# Patient Record
Sex: FEMALE | URBAN - METROPOLITAN AREA
[De-identification: names, ages, dates, MRNs, and addresses within clinical notes are randomized per-mention and may not be internally consistent; named-entity substitution may affect disease eponyms.]

---

## 2023-01-20 LAB
HEPATITIS B SURFACE ANTIGEN (EXTERNAL): NEGATIVE
RUBELLA ANTIBODY IGG (EXTERNAL): NORMAL
VDRL (SYPHILIS) (EXTERNAL): NONREACTIVE

## 2023-08-01 LAB — GROUP B STREPTOCOCCUS (EXTERNAL): NEGATIVE

## 2023-08-19 ENCOUNTER — ANESTHESIA (OUTPATIENT)
Dept: OBGYN | Facility: CLINIC | Age: 33
End: 2023-08-19
Payer: COMMERCIAL

## 2023-08-19 ENCOUNTER — HOSPITAL ENCOUNTER (INPATIENT)
Facility: CLINIC | Age: 33
LOS: 2 days | Discharge: HOME | End: 2023-08-21
Attending: OBSTETRICS & GYNECOLOGY | Admitting: OBSTETRICS & GYNECOLOGY
Payer: COMMERCIAL

## 2023-08-19 ENCOUNTER — ANESTHESIA EVENT (OUTPATIENT)
Dept: OBGYN | Facility: CLINIC | Age: 33
End: 2023-08-19
Payer: COMMERCIAL

## 2023-08-19 LAB
ABO/RH(D): NORMAL
ANTIBODY SCREEN: NEGATIVE
HGB BLD-MCNC: 12.8 G/DL (ref 11.7–15.7)
HOLD SPECIMEN: NORMAL
HOLD SPECIMEN: NORMAL
SPECIMEN EXPIRATION DATE: NORMAL

## 2023-08-19 PROCEDURE — 120N000001 HC R&B MED SURG/OB

## 2023-08-19 PROCEDURE — 00HU33Z INSERTION OF INFUSION DEVICE INTO SPINAL CANAL, PERCUTANEOUS APPROACH: ICD-10-PCS | Performed by: ANESTHESIOLOGY

## 2023-08-19 PROCEDURE — 86780 TREPONEMA PALLIDUM: CPT | Performed by: OBSTETRICS & GYNECOLOGY

## 2023-08-19 PROCEDURE — 370N000003 HC ANESTHESIA WARD SERVICE: Performed by: ANESTHESIOLOGY

## 2023-08-19 PROCEDURE — 258N000003 HC RX IP 258 OP 636: Performed by: OBSTETRICS & GYNECOLOGY

## 2023-08-19 PROCEDURE — 36415 COLL VENOUS BLD VENIPUNCTURE: CPT | Performed by: OBSTETRICS & GYNECOLOGY

## 2023-08-19 PROCEDURE — G0463 HOSPITAL OUTPT CLINIC VISIT: HCPCS

## 2023-08-19 PROCEDURE — 85018 HEMOGLOBIN: CPT | Performed by: OBSTETRICS & GYNECOLOGY

## 2023-08-19 PROCEDURE — 86850 RBC ANTIBODY SCREEN: CPT | Performed by: OBSTETRICS & GYNECOLOGY

## 2023-08-19 PROCEDURE — 3E0R3BZ INTRODUCTION OF ANESTHETIC AGENT INTO SPINAL CANAL, PERCUTANEOUS APPROACH: ICD-10-PCS | Performed by: ANESTHESIOLOGY

## 2023-08-19 PROCEDURE — 250N000011 HC RX IP 250 OP 636: Mod: JZ | Performed by: ANESTHESIOLOGY

## 2023-08-19 PROCEDURE — 250N000011 HC RX IP 250 OP 636: Performed by: ANESTHESIOLOGY

## 2023-08-19 RX ORDER — KETOROLAC TROMETHAMINE 30 MG/ML
30 INJECTION, SOLUTION INTRAMUSCULAR; INTRAVENOUS
Status: DISCONTINUED | OUTPATIENT
Start: 2023-08-19 | End: 2023-08-20

## 2023-08-19 RX ORDER — OXYTOCIN/0.9 % SODIUM CHLORIDE 30/500 ML
340 PLASTIC BAG, INJECTION (ML) INTRAVENOUS CONTINUOUS PRN
Status: DISCONTINUED | OUTPATIENT
Start: 2023-08-19 | End: 2023-08-20

## 2023-08-19 RX ORDER — METOCLOPRAMIDE HYDROCHLORIDE 5 MG/ML
10 INJECTION INTRAMUSCULAR; INTRAVENOUS EVERY 6 HOURS PRN
Status: DISCONTINUED | OUTPATIENT
Start: 2023-08-19 | End: 2023-08-20

## 2023-08-19 RX ORDER — PROCHLORPERAZINE MALEATE 10 MG
10 TABLET ORAL EVERY 6 HOURS PRN
Status: DISCONTINUED | OUTPATIENT
Start: 2023-08-19 | End: 2023-08-20

## 2023-08-19 RX ORDER — IBUPROFEN 400 MG/1
800 TABLET, FILM COATED ORAL
Status: DISCONTINUED | OUTPATIENT
Start: 2023-08-19 | End: 2023-08-20

## 2023-08-19 RX ORDER — ROPIVACAINE HYDROCHLORIDE 2 MG/ML
10 INJECTION, SOLUTION EPIDURAL; INFILTRATION; PERINEURAL ONCE
Status: DISCONTINUED | OUTPATIENT
Start: 2023-08-19 | End: 2023-08-20

## 2023-08-19 RX ORDER — PROCHLORPERAZINE 25 MG
25 SUPPOSITORY, RECTAL RECTAL EVERY 12 HOURS PRN
Status: DISCONTINUED | OUTPATIENT
Start: 2023-08-19 | End: 2023-08-20

## 2023-08-19 RX ORDER — CITRIC ACID/SODIUM CITRATE 334-500MG
30 SOLUTION, ORAL ORAL
Status: DISCONTINUED | OUTPATIENT
Start: 2023-08-19 | End: 2023-08-20

## 2023-08-19 RX ORDER — LIDOCAINE HYDROCHLORIDE AND EPINEPHRINE 15; 5 MG/ML; UG/ML
3 INJECTION, SOLUTION EPIDURAL
Status: DISCONTINUED | OUTPATIENT
Start: 2023-08-19 | End: 2023-08-20

## 2023-08-19 RX ORDER — NALOXONE HYDROCHLORIDE 0.4 MG/ML
0.2 INJECTION, SOLUTION INTRAMUSCULAR; INTRAVENOUS; SUBCUTANEOUS
Status: DISCONTINUED | OUTPATIENT
Start: 2023-08-19 | End: 2023-08-20

## 2023-08-19 RX ORDER — ONDANSETRON 2 MG/ML
4 INJECTION INTRAMUSCULAR; INTRAVENOUS EVERY 6 HOURS PRN
Status: DISCONTINUED | OUTPATIENT
Start: 2023-08-19 | End: 2023-08-20

## 2023-08-19 RX ORDER — OXYTOCIN 10 [USP'U]/ML
10 INJECTION, SOLUTION INTRAMUSCULAR; INTRAVENOUS
Status: DISCONTINUED | OUTPATIENT
Start: 2023-08-19 | End: 2023-08-20

## 2023-08-19 RX ORDER — MISOPROSTOL 200 UG/1
400 TABLET ORAL
Status: DISCONTINUED | OUTPATIENT
Start: 2023-08-19 | End: 2023-08-20

## 2023-08-19 RX ORDER — ONDANSETRON 4 MG/1
4 TABLET, ORALLY DISINTEGRATING ORAL EVERY 6 HOURS PRN
Status: DISCONTINUED | OUTPATIENT
Start: 2023-08-19 | End: 2023-08-20

## 2023-08-19 RX ORDER — ROPIVACAINE HYDROCHLORIDE 2 MG/ML
INJECTION, SOLUTION EPIDURAL; INFILTRATION; PERINEURAL
Status: COMPLETED | OUTPATIENT
Start: 2023-08-19 | End: 2023-08-19

## 2023-08-19 RX ORDER — NALOXONE HYDROCHLORIDE 0.4 MG/ML
0.4 INJECTION, SOLUTION INTRAMUSCULAR; INTRAVENOUS; SUBCUTANEOUS
Status: DISCONTINUED | OUTPATIENT
Start: 2023-08-19 | End: 2023-08-20

## 2023-08-19 RX ORDER — CARBOPROST TROMETHAMINE 250 UG/ML
250 INJECTION, SOLUTION INTRAMUSCULAR
Status: DISCONTINUED | OUTPATIENT
Start: 2023-08-19 | End: 2023-08-20

## 2023-08-19 RX ORDER — FENTANYL CITRATE-0.9 % NACL/PF 10 MCG/ML
100 PLASTIC BAG, INJECTION (ML) INTRAVENOUS EVERY 5 MIN PRN
Status: DISCONTINUED | OUTPATIENT
Start: 2023-08-19 | End: 2023-08-20

## 2023-08-19 RX ORDER — NALBUPHINE HYDROCHLORIDE 20 MG/ML
2.5-5 INJECTION, SOLUTION INTRAMUSCULAR; INTRAVENOUS; SUBCUTANEOUS EVERY 6 HOURS PRN
Status: DISCONTINUED | OUTPATIENT
Start: 2023-08-19 | End: 2023-08-20

## 2023-08-19 RX ORDER — TRANEXAMIC ACID 10 MG/ML
1 INJECTION, SOLUTION INTRAVENOUS EVERY 30 MIN PRN
Status: DISCONTINUED | OUTPATIENT
Start: 2023-08-19 | End: 2023-08-20

## 2023-08-19 RX ORDER — OXYTOCIN/0.9 % SODIUM CHLORIDE 30/500 ML
100-340 PLASTIC BAG, INJECTION (ML) INTRAVENOUS CONTINUOUS PRN
Status: DISCONTINUED | OUTPATIENT
Start: 2023-08-19 | End: 2023-08-20

## 2023-08-19 RX ORDER — METHYLERGONOVINE MALEATE 0.2 MG/ML
200 INJECTION INTRAVENOUS
Status: DISCONTINUED | OUTPATIENT
Start: 2023-08-19 | End: 2023-08-20

## 2023-08-19 RX ORDER — MISOPROSTOL 200 UG/1
800 TABLET ORAL
Status: DISCONTINUED | OUTPATIENT
Start: 2023-08-19 | End: 2023-08-20

## 2023-08-19 RX ORDER — METOCLOPRAMIDE 10 MG/1
10 TABLET ORAL EVERY 6 HOURS PRN
Status: DISCONTINUED | OUTPATIENT
Start: 2023-08-19 | End: 2023-08-20

## 2023-08-19 RX ADMIN — SODIUM CHLORIDE, POTASSIUM CHLORIDE, SODIUM LACTATE AND CALCIUM CHLORIDE 1000 ML: 600; 310; 30; 20 INJECTION, SOLUTION INTRAVENOUS at 20:13

## 2023-08-19 RX ADMIN — Medication: at 21:09

## 2023-08-19 RX ADMIN — ROPIVACAINE HYDROCHLORIDE 10 ML: 2 INJECTION, SOLUTION EPIDURAL; INFILTRATION at 21:22

## 2023-08-19 ASSESSMENT — ACTIVITIES OF DAILY LIVING (ADL)
WALKING_OR_CLIMBING_STAIRS_DIFFICULTY: NO
DRESSING/BATHING_DIFFICULTY: NO
ADLS_ACUITY_SCORE: 18
DIFFICULTY_EATING/SWALLOWING: NO
CHANGE_IN_FUNCTIONAL_STATUS_SINCE_ONSET_OF_CURRENT_ILLNESS/INJURY: NO
CONCENTRATING,_REMEMBERING_OR_MAKING_DECISIONS_DIFFICULTY: NO
FALL_HISTORY_WITHIN_LAST_SIX_MONTHS: NO
ADLS_ACUITY_SCORE: 35
TOILETING_ISSUES: NO
DOING_ERRANDS_INDEPENDENTLY_DIFFICULTY: NO
WEAR_GLASSES_OR_BLIND: NO
ADLS_ACUITY_SCORE: 18

## 2023-08-20 LAB
HGB BLD-MCNC: 10.1 G/DL (ref 11.7–15.7)
T PALLIDUM AB SER QL: NONREACTIVE

## 2023-08-20 PROCEDURE — 722N000001 HC LABOR CARE VAGINAL DELIVERY SINGLE

## 2023-08-20 PROCEDURE — 85018 HEMOGLOBIN: CPT | Performed by: OBSTETRICS & GYNECOLOGY

## 2023-08-20 PROCEDURE — 250N000013 HC RX MED GY IP 250 OP 250 PS 637: Performed by: OBSTETRICS & GYNECOLOGY

## 2023-08-20 PROCEDURE — 120N000012 HC R&B POSTPARTUM

## 2023-08-20 PROCEDURE — 36415 COLL VENOUS BLD VENIPUNCTURE: CPT | Performed by: OBSTETRICS & GYNECOLOGY

## 2023-08-20 PROCEDURE — 0KQM0ZZ REPAIR PERINEUM MUSCLE, OPEN APPROACH: ICD-10-PCS | Performed by: OBSTETRICS & GYNECOLOGY

## 2023-08-20 PROCEDURE — 250N000009 HC RX 250: Performed by: OBSTETRICS & GYNECOLOGY

## 2023-08-20 PROCEDURE — 999N000016 HC STATISTIC ATTENDANCE AT DELIVERY

## 2023-08-20 RX ORDER — ACETAMINOPHEN 325 MG/10.15ML
650 LIQUID ORAL EVERY 4 HOURS PRN
Status: DISCONTINUED | OUTPATIENT
Start: 2023-08-20 | End: 2023-08-21 | Stop reason: HOSPADM

## 2023-08-20 RX ORDER — CARBOPROST TROMETHAMINE 250 UG/ML
250 INJECTION, SOLUTION INTRAMUSCULAR
Status: DISCONTINUED | OUTPATIENT
Start: 2023-08-20 | End: 2023-08-21 | Stop reason: HOSPADM

## 2023-08-20 RX ORDER — NALOXONE HYDROCHLORIDE 0.4 MG/ML
0.4 INJECTION, SOLUTION INTRAMUSCULAR; INTRAVENOUS; SUBCUTANEOUS
Status: DISCONTINUED | OUTPATIENT
Start: 2023-08-20 | End: 2023-08-21 | Stop reason: HOSPADM

## 2023-08-20 RX ORDER — IBUPROFEN 400 MG/1
800 TABLET, FILM COATED ORAL EVERY 6 HOURS PRN
Status: DISCONTINUED | OUTPATIENT
Start: 2023-08-20 | End: 2023-08-20

## 2023-08-20 RX ORDER — MODIFIED LANOLIN
OINTMENT (GRAM) TOPICAL
Status: DISCONTINUED | OUTPATIENT
Start: 2023-08-20 | End: 2023-08-21 | Stop reason: HOSPADM

## 2023-08-20 RX ORDER — MISOPROSTOL 200 UG/1
400 TABLET ORAL
Status: DISCONTINUED | OUTPATIENT
Start: 2023-08-20 | End: 2023-08-21 | Stop reason: HOSPADM

## 2023-08-20 RX ORDER — NALOXONE HYDROCHLORIDE 0.4 MG/ML
0.2 INJECTION, SOLUTION INTRAMUSCULAR; INTRAVENOUS; SUBCUTANEOUS
Status: DISCONTINUED | OUTPATIENT
Start: 2023-08-20 | End: 2023-08-21 | Stop reason: HOSPADM

## 2023-08-20 RX ORDER — HYDROCORTISONE 25 MG/G
CREAM TOPICAL 3 TIMES DAILY PRN
Status: DISCONTINUED | OUTPATIENT
Start: 2023-08-20 | End: 2023-08-21 | Stop reason: HOSPADM

## 2023-08-20 RX ORDER — METHYLERGONOVINE MALEATE 0.2 MG/ML
200 INJECTION INTRAVENOUS
Status: DISCONTINUED | OUTPATIENT
Start: 2023-08-20 | End: 2023-08-21 | Stop reason: HOSPADM

## 2023-08-20 RX ORDER — OXYTOCIN 10 [USP'U]/ML
10 INJECTION, SOLUTION INTRAMUSCULAR; INTRAVENOUS
Status: DISCONTINUED | OUTPATIENT
Start: 2023-08-20 | End: 2023-08-21 | Stop reason: HOSPADM

## 2023-08-20 RX ORDER — OXYTOCIN/0.9 % SODIUM CHLORIDE 30/500 ML
340 PLASTIC BAG, INJECTION (ML) INTRAVENOUS CONTINUOUS PRN
Status: DISCONTINUED | OUTPATIENT
Start: 2023-08-20 | End: 2023-08-21 | Stop reason: HOSPADM

## 2023-08-20 RX ORDER — BISACODYL 10 MG
10 SUPPOSITORY, RECTAL RECTAL DAILY PRN
Status: DISCONTINUED | OUTPATIENT
Start: 2023-08-20 | End: 2023-08-21 | Stop reason: HOSPADM

## 2023-08-20 RX ORDER — DOCUSATE SODIUM 100 MG/1
100 CAPSULE, LIQUID FILLED ORAL DAILY
Status: DISCONTINUED | OUTPATIENT
Start: 2023-08-20 | End: 2023-08-20

## 2023-08-20 RX ORDER — DOCUSATE SODIUM 100 MG/1
100 CAPSULE, LIQUID FILLED ORAL DAILY
Status: DISCONTINUED | OUTPATIENT
Start: 2023-08-21 | End: 2023-08-21 | Stop reason: HOSPADM

## 2023-08-20 RX ORDER — TRANEXAMIC ACID 10 MG/ML
1 INJECTION, SOLUTION INTRAVENOUS EVERY 30 MIN PRN
Status: DISCONTINUED | OUTPATIENT
Start: 2023-08-20 | End: 2023-08-21 | Stop reason: HOSPADM

## 2023-08-20 RX ORDER — IBUPROFEN 100 MG/5ML
800 SUSPENSION, ORAL (FINAL DOSE FORM) ORAL EVERY 6 HOURS PRN
Status: DISCONTINUED | OUTPATIENT
Start: 2023-08-20 | End: 2023-08-21 | Stop reason: HOSPADM

## 2023-08-20 RX ORDER — MISOPROSTOL 200 UG/1
800 TABLET ORAL
Status: DISCONTINUED | OUTPATIENT
Start: 2023-08-20 | End: 2023-08-21 | Stop reason: HOSPADM

## 2023-08-20 RX ORDER — ACETAMINOPHEN 325 MG/1
650 TABLET ORAL EVERY 4 HOURS PRN
Status: DISCONTINUED | OUTPATIENT
Start: 2023-08-20 | End: 2023-08-20

## 2023-08-20 RX ADMIN — LIDOCAINE HYDROCHLORIDE 20 ML: 10 INJECTION, SOLUTION EPIDURAL; INFILTRATION; INTRACAUDAL; PERINEURAL at 00:41

## 2023-08-20 RX ADMIN — IBUPROFEN 800 MG: 200 SUSPENSION ORAL at 16:37

## 2023-08-20 RX ADMIN — ACETAMINOPHEN 650 MG: 325 SUSPENSION ORAL at 13:16

## 2023-08-20 RX ADMIN — Medication 340 ML/HR: at 00:31

## 2023-08-20 RX ADMIN — ACETAMINOPHEN 650 MG: 325 SUSPENSION ORAL at 01:35

## 2023-08-20 RX ADMIN — DOCUSATE SODIUM LIQUID 100 MG: 100 LIQUID ORAL at 11:16

## 2023-08-20 RX ADMIN — IBUPROFEN 800 MG: 200 SUSPENSION ORAL at 11:16

## 2023-08-20 RX ADMIN — ACETAMINOPHEN 650 MG: 325 SUSPENSION ORAL at 19:38

## 2023-08-20 RX ADMIN — IBUPROFEN 800 MG: 200 SUSPENSION ORAL at 01:33

## 2023-08-20 ASSESSMENT — ACTIVITIES OF DAILY LIVING (ADL)
ADLS_ACUITY_SCORE: 18

## 2023-08-20 NOTE — ANESTHESIA PROCEDURE NOTES
"Epidural catheter Procedure Note    Pre-Procedure   Staff -        Anesthesiologist:  Jorge Luis Jarvis MD       Performed By: Anesthesiologist       Location: OB       Procedure Start/Stop Times: 8/19/2023 9:04 PM and 8/19/2023 9:22 PM       Pre-Anesthestic Checklist: patient identified, IV checked, site marked, risks and benefits discussed, informed consent, monitors and equipment checked and pre-op evaluation  Timeout:       Correct Patient: Yes        Correct Procedure: Yes        Correct Site: Yes        Correct Position: Yes   Procedure Documentation  Procedure: epidural catheter       Patient Position: sitting       Patient Prep/Sterile Barriers: sterile gloves, mask, patient draped       Skin prep: Betadine       Local skin infiltrated with 3 mL of 1% lidocaine.        Insertion Site: L3-4. (midline approach).       Technique: LORT air        Needle Type: Metconnexy needle       Needle Gauge: 17.        Needle Length (Inches): 3.5        Catheter: 19 G.          Catheter threaded easily.         3.5 cm epidural space.           # of attempts: 1 and  # of redirects:  0    Assessment/Narrative         Paresthesias: No.       Test dose of 3 mL lidocaine 1.5% w/ 1:200,000 epinephrine at 21:22 CDT.         Test dose negative, 3 minutes after injection, for signs of intravascular, subdural, or intrathecal injection.       Insertion/Infusion Method: LORT air       Aspiration negative for Heme or CSF via Epidural Catheter.    Medication(s) Administered   0.2% Ropivacaine (Epidural) - EPIDURAL   10 mL - 8/19/2023 9:22:00 PM  Medication Administration Time: 8/19/2023 9:04 PM     Comments:  Pt tolerated procedure well.   No complications.    The epidural was bolused with 10 ml of 0.2% Ropivacaine in incremental doses and intermittent negative aspiration after negative test dose.         FOR [facility] ([facility] [place]) ONLY:   Pain Team Contact information: please page the Pain Team Via [employer]. Search \"Pain\". During daytime hours, " please page the attending first. At night please page the resident first.

## 2023-08-20 NOTE — H&P
No significant change in general health status based on examination of the patient, review of Nursing Admission Database and prenatal record.    EFW: 7lb 8oz    Candice Martinez MD

## 2023-08-20 NOTE — PLAN OF CARE
Data: Patient presented to [facility]: 2023  7:02 PM.  Reason for maternal/fetal assessment is uterine contractions. Patient reports contractions every 2 minutes for last few hours. Patient denies leaking of vaginal fluid/rupture of membranes, vaginal bleeding. Patient reports fetal movement is normal. Patient is a 40w0d . Prenatal record reviewed. Pregnancy has been uncomplicated. Support person is present.     Fetal HR baseline was 125 bpm, variability is moderate. Accelerations: present. Decelerations: absent. Uterine assessment is strong/firm during contractions and soft at rest. Cervix 5 cm dilated and 80-90% effaced. Fetal station -2. Fetal presentation cephalic per cervical exam. Membranes: intact.    Vital signs wnl. Patient reports pain and is coping.     Action: Verbal consent for EFM. Triage assessment completed. Patient does not meet criteria for early labor discharge.     Response: Patient verbalized agreement with plan. Dr. Martinez on unit and notified of but not limited to SVE, contractions, and fetal heart tracing. Orders to admit to inpatient.

## 2023-08-20 NOTE — PROCEDURES
Delivery Note  Diagnosis: Intrauterine pregnancy at 40w1d,  , Spontaneous labor  Procedure:  Findings: Liveborn female infant,  Anesthesia: Epidural    QBL:  100 cc    Nahomy Grande is a 33 year old  female at 40w1d weeks gestation. Her pregnancy was uncomplicated. She presented with painful contractions. Her membranes were artificially ruptured.  She received epidural for pain management. However, after hour of pushing had poor pain control and fatigue. Requested VAVD. Verbal consent obtained. Bladder was empties and NICU present. 3+ in JARAD. Placed 2 cm anterior to the posterior fontanelle. 1 pull, 5 seconds and no pop off. Patient progressed to complete and delivered a viable infant without complication. The infant was placed on the patient's abdomen. Cord clamping was delayed by 1 minutes, at which time the cord was doubly clamped and cut. The third stage was actively managed with external uterine massage, gentle cord traction and pitocin. The placenta delivered spontaneously and intact. Second degree laceration noted and repaired in the usual fashion. Excellent hemostasis was noted. All counts were correct before and after the delivery.     Candiec Martinez MD

## 2023-08-20 NOTE — PLAN OF CARE
Vital signs stable. Postpartum assessment WDL. Pain controlled with Tylenol and Ibuprofen given at 0140 in L&D. Patient voiding without difficulty. Working on breastfeeding infant every 2-3 hours. Patient and infant bonding well. Encouraged to call with questions/concerns. Will continue with current plan of care.

## 2023-08-20 NOTE — PROGRESS NOTES
"Labor Progress Note:    S: Pt is comfortable with epidural. Cervix was 3.5 cm in the clinic and had membranes stripped in the office. Had onset of contractions and reported to labor and delivery.     O:  /70   Temp 97.1  F (36.2  C)   Ht 1.651 m (5' 5\")   Wt 73.9 kg (163 lb)   SpO2 97%   BMI 27.12 kg/m    SVE: 8.5/80/-2 AROM  TOCO: Q2 minutes  FHR: 145 baseline. Category 1.     A/P: 32 yo  at 40+4/7 wks.   Anticipate . Due to rapid progressions will recheck as needed.   Epidural for pain management.   3. GBS negative, RH positive.     Candice Martinez MD    "

## 2023-08-20 NOTE — PLAN OF CARE
0300: Patient up to bathroom via ambulation. Voided 200mL and educated on pericares and expected bleeding.     Patient transferred up to postpartum room 408 via wheelchair with infant in arms, both in stable condition.     Bedside report given to Leola LUNA RN and verified ID bands.

## 2023-08-20 NOTE — PROGRESS NOTES
[facility]   Obstetrics Progress Note    Subjective: This is the patient's first day since Vaginal delivery. She is doing well.  She is urinating on her own. Pain is controlled with medication.    Objective:   All vitals stable  Temp: 98  F (36.7  C) Temp src: Oral BP: 110/62 Pulse: 97   Resp: 16 SpO2: 98 % O2 Device: None (Room air)      EXAM:  Constitutional: healthy, alert, no distress.   Abdomen: Abdomen soft, non-tender. BS normal. No masses, fundus is firm.  JOINT/EXTREMITIES: extremities normal     Last hemoglobin was   Hemoglobin   Date Value Ref Range Status   08/19/2023 12.8 11.7 - 15.7 g/dL Final   ]    Assessment: Stable postpartum course.    Plan: Routine care. Ambulation encouraged  Breast feeding strategies discussed  Pain control measures as needed  Reportable signs and symptoms dicussed with the patient  Anticipate discharge tomorrow    Candice Martinez MD

## 2023-08-20 NOTE — PLAN OF CARE
Vital signs stable. Postpartum assessment WDL. Pain controlled with liquid tylenol, ibuprofen. Patient voiding without difficulty. Breastfeeding on cue with full assist, baby sleepy so skin to skin done, using shield PRN. Patient and infant bonding well. Will continue with current plan of care.

## 2023-08-20 NOTE — PROGRESS NOTES
2230: Provider at bedside, SVE 10 cm. Patient educated on pushing and expected plan for delivery. Patient and spouse verbalized understanding.     2236: Bed broke down and pushing started, RN and provider continuously at bedside.     0020: Patient requesting vacuum assistance from provider due to intolerance of pain and fatigue. Provider educated on risks and benefits and patient and spouse verbalized understanding.     0026: NICU team called for vacuum delivery, Provider applied vacuum and pulled x1 for 5 seconds.  of female infant @0026 with 2nd degree laceration with repair.

## 2023-08-20 NOTE — ANESTHESIA PREPROCEDURE EVALUATION
Anesthesia Pre-Procedure Evaluation    Patient: Nahmoy Grande   MRN: [id number] : 1990        Procedure : * No procedures listed *          History reviewed. No pertinent past medical history.   History reviewed. No pertinent surgical history.   Not on File   Social History     Tobacco Use    Smoking status: Never     Passive exposure: Never    Smokeless tobacco: Never   Substance Use Topics    Alcohol use: Not Currently      Wt Readings from Last 1 Encounters:   23 73.9 kg (163 lb)        Anesthesia Evaluation   Pt has had prior anesthetic.     No history of anesthetic complications       ROS/MED HX  ENT/Pulmonary:    (-) asthma   Neurologic:  - neg neurologic ROS     Cardiovascular:    (-) PIH   METS/Exercise Tolerance:     Hematologic:     (+)  no anticoagulation therapy, no coagulopathy,             Musculoskeletal:       GI/Hepatic:     (+) GERD,                   Renal/Genitourinary:       Endo:       Psychiatric/Substance Use:       Infectious Disease:       Malignancy:       Other:            Physical Exam    Airway        Mallampati: II   TM distance: > 3 FB   Neck ROM: full     Respiratory Devices and Support         Dental  no notable dental history         Cardiovascular   cardiovascular exam normal          Pulmonary   pulmonary exam normal                OUTSIDE LABS:  CBC:   Lab Results   Component Value Date    HGB 12.8 2023     BMP: No results found for: NA, POTASSIUM, CHLORIDE, CO2, BUN, CR, GLC  COAGS: No results found for: PTT, INR, FIBR  POC: No results found for: BGM, HCG, HCGS  HEPATIC: No results found for: ALBUMIN, PROTTOTAL, ALT, AST, GGT, ALKPHOS, BILITOTAL, BILIDIRECT, ALPESH  OTHER: No results found for: PH, LACT, A1C, KOBE, PHOS, MAG, LIPASE, AMYLASE, TSH, T4, T3, CRP, SED    Anesthesia Plan    ASA Status:  2       Anesthesia Type: Epidural.              Consents    Anesthesia Plan(s) and associated risks, benefits, and realistic alternatives discussed. Questions  answered and patient/representative(s) expressed understanding.     - Discussed:     - Discussed with:  Patient            Postoperative Care            Comments:    Other Comments: Orders to manage the epidural infusion have been entered, and through coordination with the nurse, we will continute to manage and monitor the patient's labor epidural.  We will continuously be available to adjust as needed thruout the entire L&D process.             Jorge Luis Jarvis MD

## 2023-08-21 VITALS
SYSTOLIC BLOOD PRESSURE: 109 MMHG | HEART RATE: 82 BPM | WEIGHT: 156.4 LBS | BODY MASS INDEX: 26.06 KG/M2 | HEIGHT: 65 IN | RESPIRATION RATE: 16 BRPM | DIASTOLIC BLOOD PRESSURE: 70 MMHG | TEMPERATURE: 97.9 F | OXYGEN SATURATION: 98 %

## 2023-08-21 PROCEDURE — 90707 MMR VACCINE SC: CPT | Performed by: OBSTETRICS & GYNECOLOGY

## 2023-08-21 PROCEDURE — 90471 IMMUNIZATION ADMIN: CPT | Performed by: OBSTETRICS & GYNECOLOGY

## 2023-08-21 PROCEDURE — 250N000013 HC RX MED GY IP 250 OP 250 PS 637: Performed by: OBSTETRICS & GYNECOLOGY

## 2023-08-21 PROCEDURE — 250N000011 HC RX IP 250 OP 636: Performed by: OBSTETRICS & GYNECOLOGY

## 2023-08-21 RX ADMIN — ACETAMINOPHEN 650 MG: 325 SUSPENSION ORAL at 00:33

## 2023-08-21 RX ADMIN — MEASLES, MUMPS, AND RUBELLA VIRUS VACCINE LIVE 0.5 ML: 1000; 12500; 1000 INJECTION, POWDER, LYOPHILIZED, FOR SUSPENSION SUBCUTANEOUS at 11:24

## 2023-08-21 RX ADMIN — DOCUSATE SODIUM 100 MG: 100 CAPSULE, LIQUID FILLED ORAL at 08:52

## 2023-08-21 RX ADMIN — IBUPROFEN 800 MG: 200 SUSPENSION ORAL at 00:33

## 2023-08-21 RX ADMIN — ACETAMINOPHEN 650 MG: 325 SUSPENSION ORAL at 08:53

## 2023-08-21 RX ADMIN — IBUPROFEN 800 MG: 200 SUSPENSION ORAL at 06:05

## 2023-08-21 ASSESSMENT — ACTIVITIES OF DAILY LIVING (ADL)
ADLS_ACUITY_SCORE: 18

## 2023-08-21 NOTE — LACTATION NOTE
Follow up Lactation visit with Nahomy, significant other Haider & baby girl Clarke. Getting ready for discharge. Nahomy reports feeding is going well, feeding with the nipple shield and baby has been latching well and feeding frequently. At time of visit, Clarke ready to feed. Reviewed general positioning guidance, using pillow and blanket roll supports, and shield placement. Clarke latched, but Nahomy felt latch was initially painful. Encouraged taking [place] off and relatching, then when latch was still painful, LC rolled lower lip down and out and able to get a deeper, more comfortable latch. Haider is comfortable helping with rolling lower lip down and out as needed to assist.    Discussed cluster feeding, what it is and when to expect it, The Second Night, satiety cues, feeding cues, and reviewed Feeding Log for home use. Encouraged to review Breastfeeding section in Your Guide to Postpartum &  Care.    Discussed listening for audible swallowing as milk volume increases and looking for milk inside of the shield after feedings to determine if baby is transferring milk well when using nipple shield. Discussed strategies for eventual weaning from shield use and recommended outpatient Lactation follow up to assist with weaning from shield.     Reviewed milk supply and engorgement. Reviewed typical timeline of milk supply initiation and progression over first 3-5 days postpartum. Discussed comfort measures for engorgement, plugged duct treatment, and warning signs of breast infection. General questions answered regarding pumping, when it's helpful and necessary. Reviewed general recommendation to wait to start pumping until breastfeeding is well established unless there are feeding difficulties or engorgement not relieved by feeding baby or hand expression. Discussed introducing a bottle and recommendation to wait for bottle introduction for 3-4 weeks unless baby needs to supplement for medical reasons.    Feeding  plan: Recommend unlimited, frequent breast feedings: At least 8 - 12 times every 24 hours. Avoid pacifiers and supplementation with formula unless medically indicated. Encouraged use of feeding log and to record feedings, and void/stool patterns. Nahomy has a breast pump for home use. Follow up with Julissa Gutierres, encouraged Lactation support within the week. Reviewed outpatient lactation resources. Nahomy & Haider very appreciative of visit.    Cece Méndez RN-C, IBCLC, MNN, PHN, BSN

## 2023-08-21 NOTE — DISCHARGE INSTRUCTIONS
You should be on pelvic rest with no heavy lifting >30 lb for 6 weeks.  Please call or return if you have fever >/= 100.4 degrees Farenheit (38.0 degrees Celsius), severe worsening abdominal/pelvic pain not relieved by oral pain medications, heavy persistent vaginal bleeding, chest pain, palpitations, shortness of breath, dizziness, depressed mood, or for any other major concern.  You may use over the counter ibuprofen (600 mg every 6 hours) and acetaminophen/tylenol (500-650 mg every 6 hours) as needed for pain.  You may also use a stool softener (eg. Colace/Docusate or Sennakot 1-2 tabs per day) as needed for constipation.  These are safe with breastfeeding.    Please call the office to schedule a routine postpartum examination in 2 AND 6 weeks, or sooner if instructed so by your physician.  If you are feeling well and have had no blood pressure issues, your 2 week follow-up appointment can be a virtual/tele-health appointment.  If you had gestational diabetes during pregnancy, then you must also schedule a 2 hour glucose test during your final 6 week postpartum examination.      Postpartum Vaginal Delivery Instructions    Activity     Ask family and friends for help when you need it.  Do not place anything in your vagina for 6 weeks.  You are not restricted on other activities, but take it easy for a few weeks to allow your body to recover from delivery.  You are able to do any activities you feel up to that point.  No driving until you have stopped taking your pain medications (usually two weeks after delivery).     Call your health care provider if you have any of these symptoms:     Increased pain, swelling, redness, or fluid around your stiches from an episiotomy or perineal tear.  A fever above 100.4 F (38 C) with or without chills when placing a thermometer under your tongue.  You soak a sanitary pad with blood within 1 hour, or you see blood clots larger than a golf ball.  Bleeding that lasts more than 6  weeks.  Vaginal discharge that smells bad.  Severe pain, cramping or tenderness in your lower belly area.  A need to urinate more frequently (use the toilet more often), more urgently (use the toilet very quickly), or it burns when you urinate.  Nausea and vomiting.  Redness, swelling or pain around a vein in your leg.  Problems breastfeeding or a red or painful area on your breast.  Chest pain and cough or are gasping for air.  Problems coping with sadness, anxiety, or depression.  If you have any concerns about hurting yourself or the baby, call your provider immediately.   You have questions or concerns after you return home.     Keep your hands clean:  Always wash your hands before touching your perineal area and stitches.  This helps reduce your risk of infection.  If your hands aren't dirty, you may use an alcohol hand-rub to clean your hands. Keep your nails clean and short.

## 2023-08-21 NOTE — PROVIDER NOTIFICATION
Dr. Jessica notified via text page that pt is non immune and MMR not ordered. Request to place MMR order if she would like vaccine given prior to discharge.

## 2023-08-21 NOTE — ANESTHESIA POSTPROCEDURE EVALUATION
Patient: Nahomy Grande    Procedure: * No procedures listed *       Anesthesia Type:  No value filed.    Note:  Disposition: Inpatient   Postop Pain Control: Uneventful            Sign Out: Well controlled pain   PONV: No   Neuro/Psych: Uneventful            Sign Out: Acceptable/Baseline neuro status   Airway/Respiratory: Uneventful            Sign Out: Acceptable/Baseline resp. status   CV/Hemodynamics: Uneventful            Sign Out: Acceptable CV status; No obvious hypovolemia; No obvious fluid overload   Other NRE: NONE   DID A NON-ROUTINE EVENT OCCUR? No    Event details/Postop Comments:  Chart reviewed and assessed; no apparent complications identified.    Darius Kolb DO             Last vitals:  Vitals:    08/20/23 0900 08/20/23 1300 08/20/23 1637   BP: 112/61 110/68 105/64   Pulse: 63 87 87   Resp: 16 16 16   Temp: 36.4  C (97.6  F) 36.5  C (97.7  F) 36.7  C (98  F)   SpO2:          Electronically Signed By: Darius Kolb DO  August 20, 2023  8:23 PM

## 2023-08-21 NOTE — PLAN OF CARE
Mom and baby discharge instructions discussed with pt and spouse who verbalize understanding. No discharge prescriptions. Pt has breast pump at home. Mom and baby ID bands matched. Security alarms removed. Mom and baby ready for discharge home.

## 2023-08-21 NOTE — PLAN OF CARE
Vital signs stable. Postpartum assessment WDL. Pain controlled with Tylenol and Ibuprofen. Patient voiding without difficulty. Working on breastfeeding infant every 2-3 hours using nipple shield. Patient and infant bonding well. Encouraged to call with questions/concerns. Will continue with current plan of care.

## 2023-08-21 NOTE — PROGRESS NOTES
"August 21, 2023      SUBJECTIVE: No acute overnight events.  Mild abdominal cramping. +Lochia light-mod.  Tolerating PO. Ambulating/urinating w/o difficulty.  Denies CP/palp/SOB/LH.    OBJECTIVE:  /73 (BP Location: Left arm)   Pulse 79   Temp 97.6  F (36.4  C) (Oral)   Resp 16   Ht 1.651 m (5' 5\")   Wt 73.9 kg (163 lb)   SpO2 98%   Breastfeeding Unknown   BMI 27.12 kg/m    Gen: NAD, A&O x 3  Abd: Uterus firm, contracted, NT  Ext: mild edema BL LEs, symmetric, no CT    Hemoglobin   Date Value Ref Range Status   08/20/2023 10.1 (L) 11.7 - 15.7 g/dL Final   08/19/2023 12.8 11.7 - 15.7 g/dL Final   ]    A/P: PPD#1 s/p normal vaginal delivery.  Doing well.  Afebrile, VSS.  - ibuprofen, tylenol prn pain  - mild anemia, recommend iron supplement daily  - breastfeeding  - regular diet as tolerated  - routine postpartum care  - discharge home today, f/u in 2 and 6 weeks      ABIEL JIMENEZ MD    "

## 2023-08-21 NOTE — LACTATION NOTE
"Lactation visit with Nahomy, FOB, and baby girl.    Infant was sleepy at time of visit, Nahomy has been snuggling infant skin to skin for about 15 minutes. Infant not waking to breastfeed, helped Nahomy hand express, easily produced colostrum. LC brought in plastic spoons and showed Nahomy how to collect her colostrum and then spoon feed infant her EBM.     Educated on  breastfeeding basics:   1) Watch for early feeding cues (licking lips, stirring or rooting, sucking movement with mouth, hands to mouth).  2) Infant should breastfeed on demand and a minimum of 8 times in 24 hours. Offer to  breastfeed infant at least every 3 hours.   3) Techniques to waking a sleepy baby to nurse: un-swaddle infant, check infant's diaper, begin snuggling skin to skin. Additionally, mom can hand express colostrum, begin gentle stimulation including stroking infant's back and feet.    Reviewed breast feeding section in our \"Guide to Postpartum and  Care.\" Highlighting page that educates to  feeding patterns/behavior. Day one \"normal sleepiness\" followed by a cluster feeding pattern on second day/night. Also reviewed feeding log in back of booklet, how to track and why tracking infant's feedings and wet/dirty diapers is important. Provided Raffi suggestions for tracking beyond day 5.     Reviewed breastfeeding positions and techniques to obtain/maintain deep latch, including nose to nipple alignment and how to support infant's shoulder blades and neck to allow flexion for optimal latch positioning. Discussed BF should feel like a strong \"tug or pull\" when infant is suckling and if mother experiences a \"pinching or biting\" sensation, how to un-latch infant properly, assess nipple shape and make any necessary adjustments with positioning before re-latching.     Discussed physiology of milk production from colostrum through milk \"coming in\". Typically women begin to feel changes to their breasts between day 3-5.      " Appreciative of visit.    Jessa Garcia RN, IBCLC

## 2023-08-21 NOTE — PLAN OF CARE
VSS.  Patient breastfeeding baby independenlty with a nipple shield.  Pain controlled with tylenol and ibuprofen.  Voiding without difficulty.  Depression screening and birth certificate completed.  Patient stated that she has a breast pump at home.  Discharge instructions were given and questions answered.  Discharging to home with  and infant.

## 2024-12-15 ENCOUNTER — HEALTH MAINTENANCE LETTER (OUTPATIENT)
Age: 34
End: 2024-12-15

## 2025-02-13 PROBLEM — J01.90 ACUTE SINUSITIS WITH SYMPTOMS > 10 DAYS: Status: ACTIVE | Noted: 2025-02-13

## 2025-02-13 PROBLEM — J30.1 SEASONAL ALLERGIC RHINITIS DUE TO POLLEN: Status: ACTIVE | Noted: 2025-02-13

## 2025-03-18 ENCOUNTER — OFFICE VISIT (OUTPATIENT)
Dept: OTOLARYNGOLOGY | Facility: CLINIC | Age: 35
End: 2025-03-18
Attending: FAMILY MEDICINE
Payer: COMMERCIAL

## 2025-03-18 DIAGNOSIS — J30.1 SEASONAL ALLERGIC RHINITIS DUE TO POLLEN: ICD-10-CM

## 2025-03-18 DIAGNOSIS — J32.9 CHRONIC RECURRENT SINUSITIS: ICD-10-CM

## 2025-03-18 DIAGNOSIS — J01.90 ACUTE SINUSITIS WITH SYMPTOMS > 10 DAYS: ICD-10-CM

## 2025-03-18 DIAGNOSIS — J34.89 CONCHA BULLOSA: ICD-10-CM

## 2025-03-18 DIAGNOSIS — J34.89 OSTIOMEATAL COMPLEX OBSTRUCTION OF NASAL SINUS: ICD-10-CM

## 2025-03-18 DIAGNOSIS — J34.2 DEVIATED NASAL SEPTUM: Primary | ICD-10-CM

## 2025-03-18 DIAGNOSIS — J34.3 NASAL TURBINATE HYPERTROPHY: ICD-10-CM

## 2025-03-18 RX ORDER — FLUTICASONE PROPIONATE 50 MCG
1-2 SPRAY, SUSPENSION (ML) NASAL DAILY
Qty: 16 G | Refills: 3 | Status: SHIPPED | OUTPATIENT
Start: 2025-03-18

## 2025-03-18 RX ORDER — AZELASTINE 1 MG/ML
2 SPRAY, METERED NASAL 2 TIMES DAILY
Qty: 30 ML | Refills: 1 | Status: SHIPPED | OUTPATIENT
Start: 2025-03-18

## 2025-03-18 ASSESSMENT — ENCOUNTER SYMPTOMS
RESPIRATORY NEGATIVE: 1
COUGH: 0
EYES NEGATIVE: 1
CONSTITUTIONAL NEGATIVE: 1
GASTROINTESTINAL NEGATIVE: 1

## 2025-03-18 NOTE — NURSING NOTE
Nahomy Grande's chief complaint for this visit includes:  Chief Complaint   Patient presents with    Consult     Seasonal allergic rhinitis, chronic recurrent sinusitis. Has not been to an Allergist. States she has had 5 infections already this year. Finish antibiotic 3-15-25. Usually starts with a Sore thrat and nasal congestion than later gets the yellow/green nasal discharge. Uses Navage, Flonase, Zyrtec and mucinex when needed. Had a CT early Feb.      PCP: No Ref-Primary, Physician    Referring Provider:  Lana Pedroza MD  [address] [place] [address]    [facility] 02/06/2025 (Exact Date)

## 2025-03-18 NOTE — LETTER
3/18/2025      Nahomy Grande  [address]  [place] [address]      Dear Colleague,    Thank you for referring your patient, Nahomy Grande, to the [facility]. Please see a copy of my visit note below.    Chief Complaint   Patient presents with     Consult     Seasonal allergic rhinitis, chronic recurrent sinusitis. Has not been to an Allergist. States she has had 5 infections already this year. Finish antibiotic 3-15-25. Usually starts with a Sore thrat and nasal congestion than later gets the yellow/green nasal discharge. Uses Navage, Flonase, Zyrtec and mucinex when needed. Had a CT early Feb.       PCP: No Ref-Primary, Physician     Referring Provider: Lana Pedroza    [facility] 02/06/2025 (Exact Date)     ENT Problem List:  Patient Active Problem List   Diagnosis     Seasonal allergic rhinitis due to pollen     Acute sinusitis with symptoms > 10 days      Current Medications:  Current Outpatient Medications   Medication Sig Dispense Refill     cetirizine (ZYRTEC) 10 MG tablet Take 1 tablet (10 mg) by mouth daily. 30 tablet 3     fluticasone (FLONASE) 50 MCG/ACT nasal spray Spray 1 spray into both nostrils.       norgestimate-ethinyl estradiol (ORTHO-CYCLEN) 0.25-35 MG-MCG tablet        No current facility-administered medications for this visit.     Surgical History:  No past surgical history on file.    CT SINUS W/O CONTRAST 2/21/25  COMPARISON:  None.     INDICATION: persistent sinusitis.     TECHNIQUE:  Noncontrast axial CT scan of the paranasal sinuses with 2-D coronal and sagittal reformatting.     FINDINGS:      Frontal Sinus:  Clear.     Ethmoid Air Cells: Clear.     Sphenoid Sinus: Clear.     Right Maxillary Sinus: Mild polypoid opacification.  Ostiomeatal complex is patent.     Left Maxillary Sinus: Mild polypoid mucosal thickening.  Ostiomeatal complex is patent.     Turbinates: Mild prominence of the nasal turbinates. Small right bay bullosa.        Nasal Septum: Leftward deviation of the nasal septum.       Temporomandibular Joints: Unremarkable.     1. Mild polypoid mucosal thickening and opacification in the maxillary sinuses as above.   2. Leftward deviation of the nasal septum.     HPI  Pleasant 34 year old female presents today as a(n) new patient for seasonal allergic rhinitis and chronic recurrent sinusitis. She reports that she is frequently sick which usually results in sinus pressure and thick mucus. She does have a toddler in . She has history of sinus infections, but notes that this past winter has been the worst for her. She also has history of seasonal allergies. She is most bothered by the sinus pressure and headaches (daily when she is sick). She describes headaches as a dull pain. She is taking mucinex and ibuprofen without any benefit. Symptoms are mainly prevalent in the winter. She states her symptoms usually start with a sore throat, runny nose, and post-nasal drip. She usually only has these symptoms for a few days before they develop into the sinus pressure and thick mucus. This tends to proceed for another 5 days. She states that she will feel better for a few days, but then her symptoms return. She also endorses sneezing.     She exercises 2-3 times a week, is drinking lots of water, and eating healthy. She has not previously seen an allergist. When she is ill, her right nostril is significantly more congested. When she does the navage irrigations, she notes that the water barely comes out of the right nostril. She has tried Flonase with no significant change in her symptoms. She has history of abscessed tonsil and strep throat as a child. She has not had her tonsils removed. She reports having a throat infection earlier this year, noting that there was plaque on the tonsils and that she received a course of antibiotics. She is not currently taking zyrtec everyday, only when she is sick.     She denies coughing    Review of  Systems   Constitutional: Negative.    HENT:  Positive for congestion.    Eyes: Negative.    Respiratory: Negative.  Negative for cough.    Gastrointestinal: Negative.    Skin: Negative.    Endo/Heme/Allergies: Negative.        Physical Exam  Vitals and nursing note reviewed.   Constitutional:       Appearance: Normal appearance.   HENT:      Head: Normocephalic and atraumatic.      Jaw: There is normal jaw occlusion.      Right Ear: Hearing, tympanic membrane and ear canal normal.      Left Ear: Hearing, tympanic membrane and ear canal normal.      Nose: Septal deviation (to the left) present. No mucosal edema, congestion or rhinorrhea.      Right Nostril: No occlusion.      Left Nostril: No occlusion.      Right Turbinates: Swollen. Not enlarged.      Left Turbinates: Swollen. Not enlarged.      Right Sinus: No maxillary sinus tenderness or frontal sinus tenderness.      Left Sinus: No maxillary sinus tenderness or frontal sinus tenderness.      Mouth/Throat:      Mouth: Mucous membranes are moist.      Pharynx: Oropharynx is clear. Uvula midline.   Eyes:      Extraocular Movements: Extraocular movements intact.   Neurological:      Mental Status: She is alert.            A/P  This pleasant patient has chronic recurrent sinusitis, acute sinusitis, seasonal allergic rhinitis, deviated nasal septum. CT sinus was independently reviewed and discussed in detail with the patient. Options including medical were discussed. Topical nasal sprays including mild steroids, antihistaminic Azelastine were reviewed. Patient will start Flonase 1-2 sprays once a day and azelastine two sprays two times a day for 6 weeks. Patient should also continue saline irrigations.     Follow up in clinic in 2 months.    Scribe/Staff:    Scribe Disclosure:   Rose QUIROZ, am serving as a scribe; to document services personally performed by Sindhu Emanuel MD based on data collection and the provider's statements to me.     Provider  Disclosure:  I agree with above History, Review of Systems, Physical exam and Plan.  I have reviewed the content of the documentation and have edited it as needed. I have personally performed the services documented here and the documentation accurately represents those services and the decisions I have made.      Electronically signed by:  Sindhu Emanuel MD         Again, thank you for allowing me to participate in the care of your patient.        Sincerely,        Sindhu Emnauel MD    Electronically signed

## 2025-03-18 NOTE — PROGRESS NOTES
Chief Complaint   Patient presents with    Consult     Seasonal allergic rhinitis, chronic recurrent sinusitis. Has not been to an Allergist. States she has had 5 infections already this year. Finish antibiotic 3-15-25. Usually starts with a Sore thrat and nasal congestion than later gets the yellow/green nasal discharge. Uses Navage, Flonase, Zyrtec and mucinex when needed. Had a CT early Feb.       PCP: No Ref-Primary, Physician     Referring Provider: Lana Pedroza    [facility] 02/06/2025 (Exact Date)     ENT Problem List:  Patient Active Problem List   Diagnosis    Seasonal allergic rhinitis due to pollen    Acute sinusitis with symptoms > 10 days      Current Medications:  Current Outpatient Medications   Medication Sig Dispense Refill    cetirizine (ZYRTEC) 10 MG tablet Take 1 tablet (10 mg) by mouth daily. 30 tablet 3    fluticasone (FLONASE) 50 MCG/ACT nasal spray Spray 1 spray into both nostrils.      norgestimate-ethinyl estradiol (ORTHO-CYCLEN) 0.25-35 MG-MCG tablet        No current facility-administered medications for this visit.     Surgical History:  No past surgical history on file.    CT SINUS W/O CONTRAST 2/21/25  COMPARISON:  None.     INDICATION: persistent sinusitis.     TECHNIQUE:  Noncontrast axial CT scan of the paranasal sinuses with 2-D coronal and sagittal reformatting.     FINDINGS:      Frontal Sinus:  Clear.     Ethmoid Air Cells: Clear.     Sphenoid Sinus: Clear.     Right Maxillary Sinus: Mild polypoid opacification.  Ostiomeatal complex is patent.     Left Maxillary Sinus: Mild polypoid mucosal thickening.  Ostiomeatal complex is patent.     Turbinates: Mild prominence of the nasal turbinates. Small right bay bullosa.       Nasal Septum: Leftward deviation of the nasal septum.       Temporomandibular Joints: Unremarkable.     1. Mild polypoid mucosal thickening and opacification in the maxillary sinuses as above.   2. Leftward deviation of the nasal septum.     HPI  Pleasant 34  year old female presents today as a(n) new patient for seasonal allergic rhinitis and chronic recurrent sinusitis. She reports that she is frequently sick which usually results in sinus pressure and thick mucus. She does have a toddler in . She has history of sinus infections, but notes that this past winter has been the worst for her. She also has history of seasonal allergies. She is most bothered by the sinus pressure and headaches (daily when she is sick). She describes headaches as a dull pain. She is taking mucinex and ibuprofen without any benefit. Symptoms are mainly prevalent in the winter. She states her symptoms usually start with a sore throat, runny nose, and post-nasal drip. She usually only has these symptoms for a few days before they develop into the sinus pressure and thick mucus. This tends to proceed for another 5 days. She states that she will feel better for a few days, but then her symptoms return. She also endorses sneezing.     She exercises 2-3 times a week, is drinking lots of water, and eating healthy. She has not previously seen an allergist. When she is ill, her right nostril is significantly more congested. When she does the navage irrigations, she notes that the water barely comes out of the right nostril. She has tried Flonase with no significant change in her symptoms. She has history of abscessed tonsil and strep throat as a child. She has not had her tonsils removed. She reports having a throat infection earlier this year, noting that there was plaque on the tonsils and that she received a course of antibiotics. She is not currently taking zyrtec everyday, only when she is sick.     She denies coughing    Review of Systems   Constitutional: Negative.    HENT:  Positive for congestion.    Eyes: Negative.    Respiratory: Negative.  Negative for cough.    Gastrointestinal: Negative.    Skin: Negative.    Endo/Heme/Allergies: Negative.        Physical Exam  Vitals and nursing  note reviewed.   Constitutional:       Appearance: Normal appearance.   HENT:      Head: Normocephalic and atraumatic.      Jaw: There is normal jaw occlusion.      Right Ear: Hearing, tympanic membrane and ear canal normal.      Left Ear: Hearing, tympanic membrane and ear canal normal.      Nose: Septal deviation (to the left) present. No mucosal edema, congestion or rhinorrhea.      Right Nostril: No occlusion.      Left Nostril: No occlusion.      Right Turbinates: Swollen. Not enlarged.      Left Turbinates: Swollen. Not enlarged.      Right Sinus: No maxillary sinus tenderness or frontal sinus tenderness.      Left Sinus: No maxillary sinus tenderness or frontal sinus tenderness.      Mouth/Throat:      Mouth: Mucous membranes are moist.      Pharynx: Oropharynx is clear. Uvula midline.   Eyes:      Extraocular Movements: Extraocular movements intact.   Neurological:      Mental Status: She is alert.            A/P  This pleasant patient has chronic recurrent sinusitis, acute sinusitis, seasonal allergic rhinitis, deviated nasal septum. CT sinus was independently reviewed and discussed in detail with the patient. Options including medical were discussed. Topical nasal sprays including mild steroids, antihistaminic Azelastine were reviewed. Patient will start Flonase 1-2 sprays once a day and azelastine two sprays two times a day for 6 weeks. Patient should also continue saline irrigations.     Follow up in clinic in 2 months.    Scribe/Staff:    Scribe Disclosure:   IRose, am serving as a scribe; to document services personally performed by Sindhu Emanuel MD based on data collection and the provider's statements to me.     Provider Disclosure:  I agree with above History, Review of Systems, Physical exam and Plan.  I have reviewed the content of the documentation and have edited it as needed. I have personally performed the services documented here and the documentation accurately  represents those services and the decisions I have made.      Electronically signed by:  Sindhu Emanuel MD

## 2025-05-14 NOTE — PROGRESS NOTES
Chief Complaint   Patient presents with    Follow Up     Seasonal allergic rhinitis and chronic sinusitis, significant improvements. Only had 1 sinus infection about 2 weeks after LOV on 3/18/25. On Astelin and Flonase, has been taking consistently which has been helpful.       PCP: No Ref-Primary, Physician     Referring Provider: No ref. provider found    There were no vitals taken for this visit.    ENT Problem List:  Patient Active Problem List   Diagnosis    Seasonal allergic rhinitis due to pollen    Acute sinusitis with symptoms > 10 days      Current Medications:  Current Outpatient Medications   Medication Sig Dispense Refill    azelastine (ASTELIN) 0.1 % nasal spray Spray 2 sprays into both nostrils 2 times daily. 30 mL 1    cetirizine (ZYRTEC) 10 MG tablet Take 1 tablet (10 mg) by mouth daily. 30 tablet 3    norgestimate-ethinyl estradiol (ORTHO-CYCLEN) 0.25-35 MG-MCG tablet        No current facility-administered medications for this visit.     Surgical History:  No past surgical history on file.    CT SINUS W/O CONTRAST 2/21/25  COMPARISON:  None.   INDICATION: persistent sinusitis.   TECHNIQUE:  Noncontrast axial CT scan of the paranasal sinuses with 2-D coronal and sagittal reformatting.     FINDINGS:    Frontal Sinus:  Clear.   Ethmoid Air Cells: Clear.   Sphenoid Sinus: Clear.   Right Maxillary Sinus: Mild polypoid opacification.  Ostiomeatal complex is patent.   Left Maxillary Sinus: Mild polypoid mucosal thickening.  Ostiomeatal complex is patent.   Turbinates: Mild prominence of the nasal turbinates. Small right bay bullosa.    Nasal Septum: Leftward deviation of the nasal septum.     Temporomandibular Joints: Unremarkable.      1. Mild polypoid mucosal thickening and opacification in the maxillary sinuses as above.   2. Leftward deviation of the nasal septum.      HPI  Pleasant 34 year old female presents today as an established patient for follow-up of seasonal allergic rhinitis. She was  previously seen on 03/18/2025 having  chronic recurrent sinusitis, acute sinusitis, seasonal allergic rhinitis, deviated nasal septum. She was prescribed Flonase and azelastine, and directed to also continue with saline irrigation. She has seen improvement with the medications. She had one sinus infection about two weeks after the previous visit. She says that her daughter had a virus, which she caught. She has had watery eyes, but nowhere nearly as bad as it used to be. She has occasional postnasal drip, but not currently. She has a history of tonsillar abscess in 2014. She has been taking Zyrtec as needed.     Review of Systems   Constitutional: Negative.    HENT: Negative.     Eyes: Negative.    Respiratory: Negative.     Gastrointestinal: Negative.    Musculoskeletal: Negative.    Skin: Negative.    Neurological: Negative.    Endo/Heme/Allergies:  Positive for environmental allergies.   Psychiatric/Behavioral: Negative.     All other systems reviewed and are negative.      Physical Exam  Vitals and nursing note reviewed.   Constitutional:       General: She is awake.      Appearance: Normal appearance.   HENT:      Head: Normocephalic and atraumatic.      Right Ear: Tympanic membrane, ear canal and external ear normal. No middle ear effusion.      Left Ear: Tympanic membrane, ear canal and external ear normal.  No middle ear effusion.      Nose: Septal deviation and congestion present.      Right Turbinates: Swollen.      Left Turbinates: Swollen.      Mouth/Throat:      Mouth: Mucous membranes are moist.   Eyes:      Extraocular Movements: Extraocular movements intact.      Pupils: Pupils are equal, round, and reactive to light.   Neurological:      Mental Status: She is alert.   Psychiatric:         Behavior: Behavior is cooperative.     A/P  This pleasant patient has seasonal allergic rhinitis due to pollen, acute sinusitis with symptoms (<10-days), chronic recurrent sinusitis, ostiomeatal complex obstruction  of nasal sinus, bay bullosa, nasal turbinate hypertrophy, and a deviated nasal septum. The patient's prior records, audiogram, and imaging were independently reviewed and discussed with the patient. CT Sinus from 02/2025 shown leftward septal deviation, conchal bullosa, and congestion that favored the left side. Physical examination shown continued congestion that may be complicated by deviated nasal septum and bilateral nasal turbinate hypertrophy. There were no signs of infection. Options including medical and surgical ones were discussed. We discussed the risks, benefits, postoperative restrictions, and recovery methods. Topical nasal sprays including mild steroids, antihistaminic Azelastine were reviewed. It has been explained that any surgical intervention would be at her discretion based on quality of life. All of the patient's questions were answered to her satisfaction. She has been advised to continue using her nasal sprays, but pause whenever she is undergoing allergy testing. She has been prescribed azelastine (ASTELIN) 0.1 % nasal spray , with instruction to spray 2 sprays into both nostrils 2 times daily. She has also been prescribed fluticasone (FLONASE) 50 MCG/ACT nasal spray, with instruction to spray 1-2 sprays into both nostrils daily. We will put in an order of referral to Allergy for consultation. She has been encouraged to reach out via [employer] for any concerns. She is in agreement with this plan and will return in 4-5 months or as needed.          Follow up in clinic in 4-5 months, or as needed.    Scribe/Staff:    Scribe Disclosure:   I, Amelia Dent, am serving as a scribe; to document services personally performed by Sindhu Emanuel MD based on data collection and the provider's statements to me.     Provider Disclosure:  I agree with above History, Review of Systems, Physical exam and Plan.  I have reviewed the content of the documentation and have edited it as needed. I have  personally performed the services documented here and the documentation accurately represents those services and the decisions I have made.      Electronically signed by:  Sindhu Emanuel MD

## 2025-05-20 ENCOUNTER — OFFICE VISIT (OUTPATIENT)
Dept: OTOLARYNGOLOGY | Facility: CLINIC | Age: 35
End: 2025-05-20
Payer: COMMERCIAL

## 2025-05-20 DIAGNOSIS — J34.89 CONCHA BULLOSA: ICD-10-CM

## 2025-05-20 DIAGNOSIS — J30.1 SEASONAL ALLERGIC RHINITIS DUE TO POLLEN: ICD-10-CM

## 2025-05-20 DIAGNOSIS — J34.89 OSTIOMEATAL COMPLEX OBSTRUCTION OF NASAL SINUS: ICD-10-CM

## 2025-05-20 DIAGNOSIS — J34.3 NASAL TURBINATE HYPERTROPHY: ICD-10-CM

## 2025-05-20 DIAGNOSIS — J32.9 CHRONIC RECURRENT SINUSITIS: ICD-10-CM

## 2025-05-20 DIAGNOSIS — J01.90 ACUTE SINUSITIS WITH SYMPTOMS > 10 DAYS: ICD-10-CM

## 2025-05-20 PROCEDURE — 99213 OFFICE O/P EST LOW 20 MIN: CPT | Performed by: OTOLARYNGOLOGY

## 2025-05-20 RX ORDER — FLUTICASONE PROPIONATE 50 MCG
1-2 SPRAY, SUSPENSION (ML) NASAL DAILY
COMMUNITY
Start: 2025-05-20

## 2025-05-20 RX ORDER — AZELASTINE 1 MG/ML
2 SPRAY, METERED NASAL 2 TIMES DAILY
Qty: 30 ML | Refills: 3 | Status: SHIPPED | OUTPATIENT
Start: 2025-05-20

## 2025-05-20 RX ORDER — FLUTICASONE PROPIONATE 50 MCG
1-2 SPRAY, SUSPENSION (ML) NASAL DAILY
Qty: 16 G | Refills: 5 | Status: SHIPPED | OUTPATIENT
Start: 2025-05-20

## 2025-05-20 ASSESSMENT — ENCOUNTER SYMPTOMS
MUSCULOSKELETAL NEGATIVE: 1
RESPIRATORY NEGATIVE: 1
CONSTITUTIONAL NEGATIVE: 1
EYES NEGATIVE: 1
PSYCHIATRIC NEGATIVE: 1
GASTROINTESTINAL NEGATIVE: 1
NEUROLOGICAL NEGATIVE: 1

## 2025-05-20 NOTE — NURSING NOTE
Nahomy Grande's chief complaint for this visit includes:  Chief Complaint   Patient presents with    Follow Up     Seasonal allergic rhinitis and chronic sinusitis, significant improvements. Only had 1 sinus infection about 2 weeks after LOV on 3/18/25. On Astelin and Flonase, has been taking consistently which has been helpful.      PCP: No Ref-Primary, Physician    Referring Provider:  Referred Self, MD  No address on file    There were no vitals taken for this visit.    Daniel Joshi, EMT  May 20, 2025

## 2025-05-20 NOTE — LETTER
5/20/2025      Nahomy Grande  [address]  [place] [address]      Dear Colleague,    Thank you for referring your patient, Nahomy Grande, to the [facility]. Please see a copy of my visit note below.    Chief Complaint   Patient presents with     Follow Up     Seasonal allergic rhinitis and chronic sinusitis, significant improvements. Only had 1 sinus infection about 2 weeks after LOV on 3/18/25. On Astelin and Flonase, has been taking consistently which has been helpful.       PCP: No Ref-Primary, Physician     Referring Provider: No ref. provider found    There were no vitals taken for this visit.    ENT Problem List:  Patient Active Problem List   Diagnosis     Seasonal allergic rhinitis due to pollen     Acute sinusitis with symptoms > 10 days      Current Medications:  Current Outpatient Medications   Medication Sig Dispense Refill     azelastine (ASTELIN) 0.1 % nasal spray Spray 2 sprays into both nostrils 2 times daily. 30 mL 1     cetirizine (ZYRTEC) 10 MG tablet Take 1 tablet (10 mg) by mouth daily. 30 tablet 3     norgestimate-ethinyl estradiol (ORTHO-CYCLEN) 0.25-35 MG-MCG tablet        No current facility-administered medications for this visit.     Surgical History:  No past surgical history on file.    CT SINUS W/O CONTRAST 2/21/25  COMPARISON:  None.   INDICATION: persistent sinusitis.   TECHNIQUE:  Noncontrast axial CT scan of the paranasal sinuses with 2-D coronal and sagittal reformatting.     FINDINGS:    Frontal Sinus:  Clear.   Ethmoid Air Cells: Clear.   Sphenoid Sinus: Clear.   Right Maxillary Sinus: Mild polypoid opacification.  Ostiomeatal complex is patent.   Left Maxillary Sinus: Mild polypoid mucosal thickening.  Ostiomeatal complex is patent.   Turbinates: Mild prominence of the nasal turbinates. Small right bay bullosa.    Nasal Septum: Leftward deviation of the nasal septum.     Temporomandibular Joints: Unremarkable.      1. Mild  polypoid mucosal thickening and opacification in the maxillary sinuses as above.   2. Leftward deviation of the nasal septum.      HPI  Pleasant 34 year old female presents today as an established patient for follow-up of seasonal allergic rhinitis. She was previously seen on 03/18/2025 having  chronic recurrent sinusitis, acute sinusitis, seasonal allergic rhinitis, deviated nasal septum. She was prescribed Flonase and azelastine, and directed to also continue with saline irrigation. She has seen improvement with the medications. She had one sinus infection about two weeks after the previous visit. She says that her daughter had a virus, which she caught. She has had watery eyes, but nowhere nearly as bad as it used to be. She has occasional postnasal drip, but not currently. She has a history of tonsillar abscess in 2014. She has been taking Zyrtec as needed.     Review of Systems   Constitutional: Negative.    HENT: Negative.     Eyes: Negative.    Respiratory: Negative.     Gastrointestinal: Negative.    Musculoskeletal: Negative.    Skin: Negative.    Neurological: Negative.    Endo/Heme/Allergies:  Positive for environmental allergies.   Psychiatric/Behavioral: Negative.     All other systems reviewed and are negative.      Physical Exam  Vitals and nursing note reviewed.   Constitutional:       General: She is awake.      Appearance: Normal appearance.   HENT:      Head: Normocephalic and atraumatic.      Right Ear: Tympanic membrane, ear canal and external ear normal. No middle ear effusion.      Left Ear: Tympanic membrane, ear canal and external ear normal.  No middle ear effusion.      Nose: Septal deviation and congestion present.      Right Turbinates: Swollen.      Left Turbinates: Swollen.      Mouth/Throat:      Mouth: Mucous membranes are moist.   Eyes:      Extraocular Movements: Extraocular movements intact.      Pupils: Pupils are equal, round, and reactive to light.   Neurological:      Mental  Status: She is alert.   Psychiatric:         Behavior: Behavior is cooperative.     A/P  This pleasant patient has seasonal allergic rhinitis due to pollen, acute sinusitis with symptoms (<10-days), chronic recurrent sinusitis, ostiomeatal complex obstruction of nasal sinus, bay bullosa, nasal turbinate hypertrophy, and a deviated nasal septum. The patient's prior records, audiogram, and imaging were independently reviewed and discussed with the patient. CT Sinus from 02/2025 shown leftward septal deviation, conchal bullosa, and congestion that favored the left side. Physical examination shown continued congestion that may be complicated by deviated nasal septum and bilateral nasal turbinate hypertrophy. There were no signs of infection. Options including medical and surgical ones were discussed. We discussed the risks, benefits, postoperative restrictions, and recovery methods. Topical nasal sprays including mild steroids, antihistaminic Azelastine were reviewed. It has been explained that any surgical intervention would be at her discretion based on quality of life. All of the patient's questions were answered to her satisfaction. She has been advised to continue using her nasal sprays, but pause whenever she is undergoing allergy testing. She has been prescribed azelastine (ASTELIN) 0.1 % nasal spray , with instruction to spray 2 sprays into both nostrils 2 times daily. She has also been prescribed fluticasone (FLONASE) 50 MCG/ACT nasal spray, with instruction to spray 1-2 sprays into both nostrils daily. We will put in an order of referral to Allergy for consultation. She has been encouraged to [facility] for [facility] concerns. She is in agreement with this plan and will return in 4-5 months or as needed.          Follow up in clinic in 4-5 months, or as needed.    Scribe/Staff:    Scribe Disclosure:   Amelia QUIROZ, am serving as a scribe; to document services personally performed by Sindhu  MD Adelfo based on data collection and the provider's statements to me.     Provider Disclosure:  I agree with above History, Review of Systems, Physical exam and Plan.  I have reviewed the content of the documentation and have edited it as needed. I have personally performed the services documented here and the documentation accurately represents those services and the decisions I have made.      Electronically signed by:  Sindhu Emanuel MD         Again, thank you for allowing me to participate in the care of your patient.        Sincerely,        Sindhu Emanuel MD    Electronically signed

## 2025-05-21 ENCOUNTER — PATIENT OUTREACH (OUTPATIENT)
Dept: CARE COORDINATION | Facility: CLINIC | Age: 35
End: 2025-05-21
Payer: COMMERCIAL